# Patient Record
Sex: MALE | Race: WHITE
[De-identification: names, ages, dates, MRNs, and addresses within clinical notes are randomized per-mention and may not be internally consistent; named-entity substitution may affect disease eponyms.]

---

## 2020-10-18 NOTE — PRE OP HISTORY & PHYSICAL
DATE OF SURGERY:  October 20, 2020.

 

CHIEF COMPLAINT:  Chronic tonsillitis and history of lingual tonsillitis.

 

HISTORY OF PRESENT ILLNESS:  This 34-year-old male has history of sore throat.  He also

complained of persistent halitosis.  The patient has no dysphagia, odynophagia or

shortness of breath.  The patient does have pain in his lingual tonsil area on exam.  He

has been treated with more than eight weeks of antibiotics, topical nasal steroid,

decongestant with no improvement. 

 

REVIEW OF SYSTEMS:

System review showed no recent cardiovascular, respiratory or GI problem.

 

PAST MEDICAL HISTORY:  He has no medical problem.

 

PAST SURGICAL HISTORY:  He has no previous surgery.

 

ALLERGIES:  HE IS ALLERGIC TO STEROID SPRAY, WHICH HE CLAIMED THAT MAKE HIM VOMIT BUT HE

CAN TAKE STEROID BY MOUTH. 

 

MEDICATIONS:  He is on Clomid and ampheta-dextro.

 

SOCIAL HISTORY:  Nonsmoker, nondrinker.

 

FAMILY HISTORY:  Noncontributory.

 

PHYSICAL EXAMINATION:

VITAL SIGNS:  The patient's vital signs were within normal limits. 

HEENT:  Ear exam showed normal tympanic membrane bilaterally.  Nasal exam showed

deviated nasal septum on the right side about 20%.  Nasal endoscopy showed mobile vocal

folds bilaterally with reproduction of some of his discomfort in the lingual tonsil

area.  Oropharynx and oral cavity show 2+ tonsils bilaterally with Mallampati level two

with tonsilliths on both sides. 

NECK:  Showed no lymph node or thyroid palpable. 

CHEST:  Showed good air entry bilaterally. 

CARDIOVASCULAR:  Showed S1, S2.  No murmur noted.

ASSESSMENT AND PLAN:  Mr. Joe has chronic tonsillitis and lingual tonsillitis, which

has been resistant to conservative therapy.  The suggested treatment is tonsillectomy

and biopsy of the lingual tonsil and other necessary procedure.  Complication of

procedure includes but not limited to bleeding, infection, hyponasal speech, nasal

regurgitation of food, airway distress, persistent recurrence of the problem.  The

alternative will be continue observation, continue antibiotic therapy, topical nasal

steroid therapy, systemic steroid therapy, decongestant.  The patient has elected to

undergo surgical procedure. 

 

 

 

 

______________________________

[facility]

D:  10/18/2020 20:28:24

T:  10/18/2020 20:53:49

Job #:  [id number]

## 2020-10-20 ENCOUNTER — HOSPITAL ENCOUNTER (OUTPATIENT)
Dept: HOSPITAL 88 - OR | Age: 35
Discharge: HOME | End: 2020-10-20
Attending: OTOLARYNGOLOGY
Payer: COMMERCIAL

## 2020-10-20 VITALS — DIASTOLIC BLOOD PRESSURE: 71 MMHG | SYSTOLIC BLOOD PRESSURE: 113 MMHG

## 2020-10-20 DIAGNOSIS — J35.01: Primary | ICD-10-CM

## 2020-10-20 DIAGNOSIS — Z11.59: ICD-10-CM

## 2020-10-20 DIAGNOSIS — D10.1: ICD-10-CM

## 2020-10-20 DIAGNOSIS — Z01.812: ICD-10-CM

## 2020-10-20 DIAGNOSIS — F90.9: ICD-10-CM

## 2020-10-20 LAB
DEPRECATED APTT PLAS QN: 27.7 SECONDS (ref 23.8–35.5)
DEPRECATED INR PLAS: 0.9
PROTHROMBIN TIME: 12.6 SECONDS (ref 11.9–14.5)

## 2020-10-20 PROCEDURE — 88305 TISSUE EXAM BY PATHOLOGIST: CPT

## 2020-10-20 PROCEDURE — 88312 SPECIAL STAINS GROUP 1: CPT

## 2020-10-20 PROCEDURE — 36415 COLL VENOUS BLD VENIPUNCTURE: CPT

## 2020-10-20 PROCEDURE — 88304 TISSUE EXAM BY PATHOLOGIST: CPT

## 2020-10-20 PROCEDURE — 42826 REMOVAL OF TONSILS: CPT

## 2020-10-20 PROCEDURE — 85610 PROTHROMBIN TIME: CPT

## 2020-10-20 PROCEDURE — 85730 THROMBOPLASTIN TIME PARTIAL: CPT

## 2020-10-20 PROCEDURE — 41105 BIOPSY OF TONGUE: CPT

## 2020-10-20 NOTE — OPERATIVE REPORT
DATE OF PROCEDURE:  10/20/2020

 

SURGEON:  Irvin Guerrero MD

 

CHIEF COMPLAINT:  Chronic tonsillitis and throat pain.

 

POSTOPERATIVE DIAGNOSIS:  Chronic tonsillitis and throat pain.

 

OPERATIVE PROCEDURE:  Tonsillectomy and biopsy of the left and right tongue base.

 

ANESTHESIA:  Anesthesiology group.

 

INDICATIONS:  This 34-year-old male who has chronic tonsillitis with tonsillith and

halitosis.  The patient also has persistent throat pain, which attribute on examination

to his lingual tonsil.  The patient has been treated with multiple antibiotics with no

improvement.  On examination, he was noted to have 2+ tonsils with exudate.  The patient

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  It was decided that tonsillectomy with biopsy of

the lingual tonsil and other necessary procedure will be beneficial for him. 

 

DESCRIPTION OF PROCEDURE:  The patient was taken to the operating room, put under

general anesthesia, endotracheally intubated.  He was put in Ann position, McIvor mouth

gag was inserted.  The tonsil fossas were injected with 1% Xylocaine with 1:100,000

epinephrine for hemostasis.  The right tonsil was retracted medially.  A plane was

created between the tonsil and tonsillar bed.  Dissection was carried down the inferior

pole, tonsil was snared off.  Similar procedure was carried out on the left side.

Again, after plane was created between the tonsil and tonsillar bed, dissection was

carried down the inferior pole and tonsil was snared off.  Hemostasis and tonsillar

fossas were achieved using the suction cautery. 

 

The tongue base was examined.  Slight increased lymphoid tissue was noted in the tongue

base.  The left and the right tongue base in the lingual tonsil area were biopsied and

sent for permanent section using a cup forceps.  Nasopharynx, oropharynx, and oral

cavity were irrigated with copious amount of normal saline.  The patient was noted to

have a bifid uvula.  No enlarged adenoid was noted, this was not disturbed.  The stomach

was suctioned out at the end of procedure.  The patient tolerated the above procedure

well with estimated blood loss of about 20 mL.  He was given 20 mg of Decadron

intraoperatively.  The patient was able to be transferred to recovery room in stable

condition. 

 

 

 

 

______________________________

Irvin Guerrero MD

 

[facility]

D:  10/20/2020 08:24:42

T:  10/20/2020 08:46:52

Job #:  [id number]